# Patient Record
Sex: FEMALE | Race: WHITE | NOT HISPANIC OR LATINO | URBAN - METROPOLITAN AREA
[De-identification: names, ages, dates, MRNs, and addresses within clinical notes are randomized per-mention and may not be internally consistent; named-entity substitution may affect disease eponyms.]

---

## 2018-05-20 ENCOUNTER — EMERGENCY (EMERGENCY)
Facility: HOSPITAL | Age: 53
LOS: 1 days | Discharge: HOME | End: 2018-05-20
Attending: EMERGENCY MEDICINE | Admitting: EMERGENCY MEDICINE
Payer: COMMERCIAL

## 2018-05-20 VITALS
TEMPERATURE: 98 F | HEIGHT: 62 IN | DIASTOLIC BLOOD PRESSURE: 84 MMHG | WEIGHT: 147.71 LBS | RESPIRATION RATE: 17 BRPM | HEART RATE: 79 BPM | OXYGEN SATURATION: 98 % | SYSTOLIC BLOOD PRESSURE: 123 MMHG

## 2018-05-20 VITALS
RESPIRATION RATE: 18 BRPM | OXYGEN SATURATION: 98 % | SYSTOLIC BLOOD PRESSURE: 133 MMHG | TEMPERATURE: 98 F | DIASTOLIC BLOOD PRESSURE: 91 MMHG | HEART RATE: 66 BPM

## 2018-05-20 DIAGNOSIS — R51 HEADACHE: ICD-10-CM

## 2018-05-20 DIAGNOSIS — G43.909 MIGRAINE, UNSPECIFIED, NOT INTRACTABLE, WITHOUT STATUS MIGRAINOSUS: ICD-10-CM

## 2018-05-20 PROCEDURE — 70496 CT ANGIOGRAPHY HEAD: CPT

## 2018-05-20 PROCEDURE — 70450 CT HEAD/BRAIN W/O DYE: CPT | Mod: 26,59

## 2018-05-20 PROCEDURE — 70496 CT ANGIOGRAPHY HEAD: CPT | Mod: 26

## 2018-05-20 PROCEDURE — 96374 THER/PROPH/DIAG INJ IV PUSH: CPT | Mod: XU

## 2018-05-20 PROCEDURE — 70498 CT ANGIOGRAPHY NECK: CPT | Mod: 26

## 2018-05-20 PROCEDURE — 99284 EMERGENCY DEPT VISIT MOD MDM: CPT | Mod: 25

## 2018-05-20 PROCEDURE — 70450 CT HEAD/BRAIN W/O DYE: CPT

## 2018-05-20 PROCEDURE — 70498 CT ANGIOGRAPHY NECK: CPT

## 2018-05-20 RX ORDER — ACETAMINOPHEN 500 MG
975 TABLET ORAL ONCE
Qty: 0 | Refills: 0 | Status: COMPLETED | OUTPATIENT
Start: 2018-05-20 | End: 2018-05-20

## 2018-05-20 RX ORDER — KETOROLAC TROMETHAMINE 30 MG/ML
15 SYRINGE (ML) INJECTION ONCE
Qty: 0 | Refills: 0 | Status: DISCONTINUED | OUTPATIENT
Start: 2018-05-20 | End: 2018-05-20

## 2018-05-20 RX ADMIN — Medication 15 MILLIGRAM(S): at 12:10

## 2018-05-20 RX ADMIN — Medication 975 MILLIGRAM(S): at 11:54

## 2018-05-20 NOTE — ED PROVIDER NOTE - OBJECTIVE STATEMENT
52 y/o female with a PMHx of migraines (on zomig prn) is present with a sudden onset of sharp sudden HA located to the back of her head. Pt reports the pain woke her from her sleep two days ago. The pain is located on occipital on the right side behind her ear. Pt reports the pain has been constant and somewhat alleviates with motrin and zomig and by placing pressure where her pain is located. Her associating symptoms include nausea and dizziness. Pt states hx of migraines, however this pain is very local, severe and the worst pain she has ever experience. Pt denies fam hx of aneurysm. She denies the following: hx of tbi, seizures, vomiting, blurred vision, double vision, slurred speech, confusion. Partner at bedside states pt with nml mental status with no abnormal behavior. 52 y/o female with a PMHx of migraines (on zomig prn) is present with a sudden onset of sharp sudden HA located to the back of her head. Pt reports the pain woke her from her sleep two days ago. The pain is located on occipital on the right side behind her ear. Pt reports the pain has been constant and somewhat alleviates with motrin and zomig and by placing pressure where her pain is located. Her associating symptoms include nausea and dizziness. Pt states hx of migraines, however this pain is very local, severe and the worst pain she has ever experience. Pt reports fam hx of aneurysm. She denies the following: hx of tbi, seizures, vomiting, blurred vision, double vision, slurred speech, confusion. Partner at bedside states pt with nml mental status with no abnormal behavior.

## 2018-05-20 NOTE — ED PROVIDER NOTE - ATTENDING CONTRIBUTION TO CARE
52 y/o female with a PMHx of migraines (on zomig prn) is present with a sudden onset of sharp sudden HA located to the back of her head. Pt reports the pain woke her from her sleep two days ago. The pain is located on occipital on the right side behind her ear. Pt reports the pain has been constant and somewhat alleviates with motrin and zomig and by placing pressure where her pain is located. Her associating symptoms include nausea and dizziness. Pt states hx of migraines, however this pain is very local, severe and the worst pain she has ever experience. Pt reports fam hx of aneurysm. She denies the following: hx of tbi, seizures, vomiting, blurred vision, double vision, slurred speech, confusion. Partner at bedside states pt with nml mental status with no abnormal behavior.    PE - very focal right posterior head pain and tenderness, neurologically intact without deficits, AAO x 3, CN II-XII intact, normal speech, strength 5/5 bilateral upper and lower extremities, sensation intact, cerebellum intact, no ataxia, follows commands appropriately    Imaging in ED neg for bleed or aneurysm most likely msk/nerve related pain, recommend antiinflammatories and further workup with neurologist.

## 2018-05-20 NOTE — ED ADULT NURSE NOTE - CHPI ED SYMPTOMS NEG
no weakness/no numbness/no change in level of consciousness/no confusion/no fever/no loss of consciousness/no blurred vision/no vomiting/no dizziness

## 2018-05-20 NOTE — ED PROVIDER NOTE - MEDICAL DECISION MAKING DETAILS
54 y/o female with a PMHx of migraines who is well-appearing with nml VS present with HA. CT and CTA head/neck negative. No concern for TIA/CVA. CT non- to r/o bleed/mass. CTA to r/o aneurysm given fam hx. Pain improved post medication. Likely migraine. Pt advised of imaging. Continue zomig as needed. Advised fu with Neuro.

## 2018-05-20 NOTE — ED ADULT NURSE NOTE - OBJECTIVE STATEMENT
The patient is a 54 y/o female who came in c/o right occipital area of the head. Pt has hx of migraine, however states :this doent feel like a migraine" Pt states she took ibuprofen and pain improved, however still there. There are no focal deficits. Pt ambulated without any difficulty.

## 2018-05-20 NOTE — ED PROVIDER NOTE - NEUROLOGICAL, MLM
Alert and oriented, no focal deficits, no motor or sensory deficits. CN II - XII intact, nml cerebellar function, negative rhomberg

## 2025-05-21 NOTE — ED PROVIDER NOTE - CARE PLAN
For information on Fall & Injury Prevention, visit: [email or web address] OR  [email or web address] OR  https://www.cdc.gov/steadi/patient.html Principal Discharge DX:	Migraines